# Patient Record
(demographics unavailable — no encounter records)

---

## 2017-02-28 NOTE — CT
CT of the abdomen and pelvis without contrast.

 

HISTORY: Pain

 

TECHNIQUE: Axial CT images were obtained of the abdomen and pelvis without contrast. Coronal and sag
ittal reconstructions obtained.

 

FINDINGS: 

The lung bases are clear, no pleural effusion.

 

The liver, spleen, and pancreas appear unremarkable. There is a dystrophic calcification adjacent to
 the right adrenal gland. Cholecystectomy. There is no bulky retroperitoneal lymphadenopathy. No abd
ominal ascites.

 

There are no calcifications noted within the kidneys or along the courses of the ureters bilaterally
. The kidneys enhance and function symmetrically without evidence of obstructive uropathy.

 

The large and small bowel are normal in caliber without evidence of obstruction. There is no bulky p
elvic lymphadenopathy. No free fluid. No free air. The urinary bladder is decompressed.

 

Degenerative changes are noted within the lower lumbar spine.

 

IMPRESSION: 

 

1. No acute findings within the abdomen or pelvis. 

2. Cholecystectomy, appendectomy, and hysterectomy..